# Patient Record
Sex: MALE | Race: WHITE | Employment: FULL TIME | URBAN - METROPOLITAN AREA
[De-identification: names, ages, dates, MRNs, and addresses within clinical notes are randomized per-mention and may not be internally consistent; named-entity substitution may affect disease eponyms.]

---

## 2021-03-30 ENCOUNTER — APPOINTMENT (OUTPATIENT)
Dept: GENERAL RADIOLOGY | Facility: CLINIC | Age: 51
End: 2021-03-30
Attending: EMERGENCY MEDICINE
Payer: COMMERCIAL

## 2021-03-30 ENCOUNTER — HOSPITAL ENCOUNTER (EMERGENCY)
Facility: CLINIC | Age: 51
Discharge: HOME | End: 2021-03-30
Attending: EMERGENCY MEDICINE | Admitting: EMERGENCY MEDICINE
Payer: COMMERCIAL

## 2021-03-30 VITALS
TEMPERATURE: 98 F | RESPIRATION RATE: 16 BRPM | HEIGHT: 66 IN | DIASTOLIC BLOOD PRESSURE: 95 MMHG | SYSTOLIC BLOOD PRESSURE: 131 MMHG | OXYGEN SATURATION: 98 % | HEART RATE: 105 BPM | BODY MASS INDEX: 21.69 KG/M2 | WEIGHT: 135 LBS

## 2021-03-30 DIAGNOSIS — E87.6 HYPOKALEMIA: ICD-10-CM

## 2021-03-30 DIAGNOSIS — R00.2 PALPITATIONS: ICD-10-CM

## 2021-03-30 DIAGNOSIS — R07.89 ATYPICAL CHEST PAIN: ICD-10-CM

## 2021-03-30 LAB
ANION GAP SERPL CALCULATED.3IONS-SCNC: 8 MMOL/L (ref 3–14)
BASOPHILS # BLD AUTO: 0 10E9/L (ref 0–0.2)
BASOPHILS NFR BLD AUTO: 0.5 %
BUN SERPL-MCNC: 19 MG/DL (ref 7–30)
CALCIUM SERPL-MCNC: 9.2 MG/DL (ref 8.5–10.1)
CHLORIDE SERPL-SCNC: 102 MMOL/L (ref 94–109)
CO2 SERPL-SCNC: 29 MMOL/L (ref 20–32)
CREAT SERPL-MCNC: 0.65 MG/DL (ref 0.66–1.25)
DIFFERENTIAL METHOD BLD: NORMAL
EOSINOPHIL # BLD AUTO: 0 10E9/L (ref 0–0.7)
EOSINOPHIL NFR BLD AUTO: 0.5 %
ERYTHROCYTE [DISTWIDTH] IN BLOOD BY AUTOMATED COUNT: 11.9 % (ref 10–15)
GFR SERPL CREATININE-BSD FRML MDRD: >90 ML/MIN/{1.73_M2}
GLUCOSE SERPL-MCNC: 266 MG/DL (ref 70–99)
HCT VFR BLD AUTO: 47.5 % (ref 40–53)
HGB BLD-MCNC: 16.7 G/DL (ref 13.3–17.7)
IMM GRANULOCYTES # BLD: 0 10E9/L (ref 0–0.4)
IMM GRANULOCYTES NFR BLD: 0.5 %
INTERPRETATION ECG - MUSE: NORMAL
LYMPHOCYTES # BLD AUTO: 2 10E9/L (ref 0.8–5.3)
LYMPHOCYTES NFR BLD AUTO: 31.6 %
MCH RBC QN AUTO: 30.9 PG (ref 26.5–33)
MCHC RBC AUTO-ENTMCNC: 35.2 G/DL (ref 31.5–36.5)
MCV RBC AUTO: 88 FL (ref 78–100)
MONOCYTES # BLD AUTO: 0.5 10E9/L (ref 0–1.3)
MONOCYTES NFR BLD AUTO: 7.6 %
NEUTROPHILS # BLD AUTO: 3.7 10E9/L (ref 1.6–8.3)
NEUTROPHILS NFR BLD AUTO: 59.3 %
NRBC # BLD AUTO: 0 10*3/UL
NRBC BLD AUTO-RTO: 0 /100
PLATELET # BLD AUTO: 185 10E9/L (ref 150–450)
POTASSIUM SERPL-SCNC: 3.1 MMOL/L (ref 3.4–5.3)
RBC # BLD AUTO: 5.41 10E12/L (ref 4.4–5.9)
SODIUM SERPL-SCNC: 139 MMOL/L (ref 133–144)
TROPONIN I SERPL-MCNC: <0.015 UG/L (ref 0–0.04)
TSH SERPL DL<=0.005 MIU/L-ACNC: 1.21 MU/L (ref 0.4–4)
WBC # BLD AUTO: 6.2 10E9/L (ref 4–11)

## 2021-03-30 PROCEDURE — 85025 COMPLETE CBC W/AUTO DIFF WBC: CPT | Performed by: EMERGENCY MEDICINE

## 2021-03-30 PROCEDURE — 93005 ELECTROCARDIOGRAM TRACING: CPT

## 2021-03-30 PROCEDURE — 84443 ASSAY THYROID STIM HORMONE: CPT | Performed by: EMERGENCY MEDICINE

## 2021-03-30 PROCEDURE — 84484 ASSAY OF TROPONIN QUANT: CPT | Performed by: EMERGENCY MEDICINE

## 2021-03-30 PROCEDURE — 99285 EMERGENCY DEPT VISIT HI MDM: CPT | Mod: 25

## 2021-03-30 PROCEDURE — 80048 BASIC METABOLIC PNL TOTAL CA: CPT | Performed by: EMERGENCY MEDICINE

## 2021-03-30 PROCEDURE — 71046 X-RAY EXAM CHEST 2 VIEWS: CPT

## 2021-03-30 PROCEDURE — 250N000013 HC RX MED GY IP 250 OP 250 PS 637: Performed by: EMERGENCY MEDICINE

## 2021-03-30 RX ORDER — ASPIRIN 81 MG/1
324 TABLET, CHEWABLE ORAL ONCE
Status: COMPLETED | OUTPATIENT
Start: 2021-03-30 | End: 2021-03-30

## 2021-03-30 RX ORDER — POTASSIUM CHLORIDE 1500 MG/1
40 TABLET, EXTENDED RELEASE ORAL ONCE
Status: COMPLETED | OUTPATIENT
Start: 2021-03-30 | End: 2021-03-30

## 2021-03-30 RX ADMIN — ASPIRIN 324 MG: 81 TABLET, CHEWABLE ORAL at 13:55

## 2021-03-30 RX ADMIN — POTASSIUM CHLORIDE 40 MEQ: 1500 TABLET, EXTENDED RELEASE ORAL at 15:19

## 2021-03-30 ASSESSMENT — ENCOUNTER SYMPTOMS
SHORTNESS OF BREATH: 1
PALPITATIONS: 1

## 2021-03-30 ASSESSMENT — MIFFLIN-ST. JEOR: SCORE: 1425.11

## 2021-03-30 NOTE — LETTER
March 30, 2021      To Whom It May Concern:      Ronaldo Peacock was seen in our Emergency Department today, 03/30/21.  I expect his condition to improve over the next day.  He may return to work when improved.    Sincerely,        Jyothi Villalta RN

## 2021-03-30 NOTE — ED PROVIDER NOTES
"  History   Chief Complaint:  Chest Pain     HPI   Ronaldo Peacock is a 50 year old male with history of hypertension, hyperlipidemia, type II diabetes, and anxiety who presents with chest pain. Patient complains of intermittent chest pain with associated palpitations and shortness of breath which presented four days ago. The chest pain begins in the morning when he goes to work but usually subsides by 1200 and resolves by the time he returns home. 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 The pain worsens with movement. Today, the pain did not subside which caused him to be concerned. His brother had a heart attack at 34 and father had a heart attack in his 60's. He recently stopped taking his hypertension medications.     Review of Systems   Respiratory: Positive for shortness of breath.    Cardiovascular: Positive for chest pain and palpitations.   All other systems reviewed and are negative.      Allergies:  No known drug allergies    Medications:  Januvia     Past Medical History:    Type II Diabetes   Hypertension  Hyperlipidemia   Anxiety   Diabetic Peripheral Neuropathy   ED    Family History:    His brother  of a heart attack at 34 and father had a heart attack in his 60's.    Social History:  Patient arrives unaccompanied.   He works at [employer].   Nonsmoker/nondrinker    Physical Exam     Patient Vitals for the past 24 hrs:   BP Temp Temp src Pulse Resp SpO2 Height Weight   21 1400 (!) 131/95 -- -- 105 -- 98 % -- --   21 1330 -- -- -- 104 -- 98 % -- --   21 1300 133/89 -- -- 103 -- 100 % -- --   21 1241 (!) 136/92 98  F (36.7  C) Oral 114 16 100 % 1.676 m (5' 6\") 61.2 kg (135 lb)       Physical Exam    Nursing note and vitals reviewed.  HENT:   Mouth/Throat: Moist mucous membranes.   Eyes: EOMI, nonicteric sclera  Cardiovascular: mild tachycardia, regular rhythm, no murmurs, rubs, or gallops  Pulmonary/Chest: Effort normal and " breath sounds normal. No respiratory distress. No wheezes. No rales.   Abdominal: Soft. Nontender, nondistended, no guarding or rigidity.   Musculoskeletal: Normal range of motion.   Neurological: Alert. Moves all extremities spontaneously.   Skin: Skin is warm and dry. No rash noted.   Psychiatric: Normal mood and affect.     Emergency Department Course   ECG  ECG taken at 1251, ECG read at 1257  Sinus tachycardia   Otherwise normal ECG   Rate 109 bpm. TX interval 116 ms. QRS duration 90 ms. QT/QTc 346/465 ms. P-R-T axes 64 6 54.     Imaging:  Chest XR,  PA & LAT  There are no acute infiltrates. The cardiac silhouette is  not enlarged. Pulmonary vasculature is unremarkable.  Reading per radiology    Laboratory:  CBC: WBC 6.2, HGB 16.7,    BMP: Glucose 266 (H), Creatinine 0.65 (L), Potassium 3.1 (L), o/w WNL    Troponin (Resulted 1413): <0.015    Emergency Department Course:    Reviewed:  I reviewed nursing notes and past medical history    Assessments:  1506 I obtained history and examined the patient as noted above.     Interventions:  1355 Aspirin 324 mg PO  1519 Klor-Con 40 mEq PO    Disposition:  The patient was discharged to home.     Impression & Plan     Medical Decision Making:  Patient presents with chief complaint several day history of chest pain.  He describes this is mostly tightness.  He notes that this pain is only present while he is at work, especially when he is busy.  He has no symptoms when he is at home.  Broad differential considered, especially given his strong family history of heart problems.  EKG negative for acute etiology.  Troponin negative.  Given the duration of his symptoms, single negative troponin is sufficient to rule out ACS.  Chest x-ray also negative.  Patient is considered low risk by HEART score. (1 point for age, 2 points for risk factors).  At this time, he is safe/stable for outpatient follow-up.  Discussed with him that his primary may consider stress testing, but  given he is low risk, would not order this from the emergency department, nor admit to the hospital. He is in stable condition at the time of discharge, indications for return to the ED were discussed as well as follow up. All questions were answered and he is in agreement with the plan.      Diagnosis:    ICD-10-CM    1. Atypical chest pain  R07.89 TSH with free T4 reflex     TSH with free T4 reflex     CANCELED: TSH with free T4 reflex   2. Hypokalemia  E87.6    3. Palpitations  R00.2        Scribe Disclosure:  I, Shorty Fagan, am serving as a scribe at 12:55 PM on 3/30/2021 to document services personally performed by Yosef Jones MD based on my observations and the provider's statements to me.            Yosef Jones MD  03/30/21 1807

## 2021-03-30 NOTE — ED TRIAGE NOTES
Patient comes in for evaluation of chest pain with palplitations. States it has been bothering him for 4 days, it started just in the morning and would subside by 1200 but is now more often. ABCs intact.